# Patient Record
Sex: MALE | Race: WHITE | NOT HISPANIC OR LATINO
[De-identification: names, ages, dates, MRNs, and addresses within clinical notes are randomized per-mention and may not be internally consistent; named-entity substitution may affect disease eponyms.]

---

## 2022-04-28 PROBLEM — Z00.00 ENCOUNTER FOR PREVENTIVE HEALTH EXAMINATION: Status: ACTIVE | Noted: 2022-04-28

## 2022-08-05 ENCOUNTER — APPOINTMENT (OUTPATIENT)
Dept: ENDOCRINOLOGY | Facility: CLINIC | Age: 47
End: 2022-08-05

## 2022-08-05 VITALS
DIASTOLIC BLOOD PRESSURE: 90 MMHG | HEART RATE: 98 BPM | SYSTOLIC BLOOD PRESSURE: 150 MMHG | OXYGEN SATURATION: 99 % | BODY MASS INDEX: 35 KG/M2 | HEIGHT: 64 IN | WEIGHT: 205 LBS

## 2022-08-05 DIAGNOSIS — Z83.3 FAMILY HISTORY OF DIABETES MELLITUS: ICD-10-CM

## 2022-08-05 DIAGNOSIS — R79.89 OTHER SPECIFIED ABNORMAL FINDINGS OF BLOOD CHEMISTRY: ICD-10-CM

## 2022-08-05 DIAGNOSIS — E78.5 HYPERLIPIDEMIA, UNSPECIFIED: ICD-10-CM

## 2022-08-05 DIAGNOSIS — R40.0 SOMNOLENCE: ICD-10-CM

## 2022-08-05 DIAGNOSIS — N52.9 MALE ERECTILE DYSFUNCTION, UNSPECIFIED: ICD-10-CM

## 2022-08-05 DIAGNOSIS — Z82.49 FAMILY HISTORY OF ISCHEMIC HEART DISEASE AND OTHER DISEASES OF THE CIRCULATORY SYSTEM: ICD-10-CM

## 2022-08-05 DIAGNOSIS — E55.9 VITAMIN D DEFICIENCY, UNSPECIFIED: ICD-10-CM

## 2022-08-05 DIAGNOSIS — I10 ESSENTIAL (PRIMARY) HYPERTENSION: ICD-10-CM

## 2022-08-05 DIAGNOSIS — R73.03 PREDIABETES.: ICD-10-CM

## 2022-08-05 PROCEDURE — 99204 OFFICE O/P NEW MOD 45 MIN: CPT

## 2022-08-09 LAB
ALBUMIN SERPL ELPH-MCNC: 4.7 G/DL
ALP BLD-CCNC: 130 U/L
ALT SERPL-CCNC: 123 U/L
ANION GAP SERPL CALC-SCNC: 12 MMOL/L
AST SERPL-CCNC: 71 U/L
BASOPHILS # BLD AUTO: 0.02 K/UL
BASOPHILS NFR BLD AUTO: 0.3 %
BILIRUB SERPL-MCNC: 0.8 MG/DL
BUN SERPL-MCNC: 18 MG/DL
CALCIUM SERPL-MCNC: 9.4 MG/DL
CHLORIDE SERPL-SCNC: 105 MMOL/L
CO2 SERPL-SCNC: 24 MMOL/L
CREAT SERPL-MCNC: 0.92 MG/DL
EGFR: 103 ML/MIN/1.73M2
EOSINOPHIL # BLD AUTO: 0.15 K/UL
EOSINOPHIL NFR BLD AUTO: 2.2 %
GLUCOSE SERPL-MCNC: 94 MG/DL
HCT VFR BLD CALC: 44.9 %
HGB BLD-MCNC: 14.2 G/DL
IMM GRANULOCYTES NFR BLD AUTO: 0.4 %
LYMPHOCYTES # BLD AUTO: 2.73 K/UL
LYMPHOCYTES NFR BLD AUTO: 40.2 %
MAN DIFF?: NORMAL
MCHC RBC-ENTMCNC: 31.6 GM/DL
MCHC RBC-ENTMCNC: 32 PG
MCV RBC AUTO: 101.1 FL
MONOCYTES # BLD AUTO: 0.58 K/UL
MONOCYTES NFR BLD AUTO: 8.5 %
NEUTROPHILS # BLD AUTO: 3.28 K/UL
NEUTROPHILS NFR BLD AUTO: 48.4 %
PLATELET # BLD AUTO: 280 K/UL
POTASSIUM SERPL-SCNC: 4.4 MMOL/L
PROT SERPL-MCNC: 7.3 G/DL
RBC # BLD: 4.44 M/UL
RBC # FLD: 13.5 %
SODIUM SERPL-SCNC: 140 MMOL/L
T3FREE SERPL-MCNC: 4 PG/ML
T4 FREE SERPL-MCNC: 1.2 NG/DL
TSH SERPL-ACNC: <0.01 UIU/ML
WBC # FLD AUTO: 6.79 K/UL

## 2022-08-10 PROBLEM — I10 HYPERTENSION: Status: ACTIVE | Noted: 2022-08-05

## 2022-08-10 PROBLEM — R73.03 PRE-DIABETES: Status: ACTIVE | Noted: 2022-08-05

## 2022-08-10 PROBLEM — E78.5 DYSLIPIDEMIA: Status: ACTIVE | Noted: 2022-08-05

## 2022-08-10 PROBLEM — N52.9 ERECTILE DYSFUNCTION: Status: ACTIVE | Noted: 2022-08-05

## 2022-08-10 PROBLEM — R40.0 DAYTIME SOMNOLENCE: Status: ACTIVE | Noted: 2022-08-05

## 2022-08-10 PROBLEM — E55.9 VITAMIN D DEFICIENCY: Status: ACTIVE | Noted: 2022-08-05

## 2022-08-10 PROBLEM — Z83.3 FAMILY HISTORY OF DIABETES MELLITUS: Status: ACTIVE | Noted: 2022-08-05

## 2022-08-10 PROBLEM — Z82.49 FAMILY HISTORY OF HYPERTENSION: Status: ACTIVE | Noted: 2022-08-05

## 2022-08-12 LAB
TSH RECEPTOR AB: <1.1 IU/L
TSI ACT/NOR SER: 0.94 IU/L

## 2022-08-15 PROBLEM — R79.89 LOW TSH LEVEL: Status: ACTIVE | Noted: 2022-08-05

## 2022-08-15 NOTE — HISTORY OF PRESENT ILLNESS
[FreeTextEntry1] : 46-year-old male referred by his primary care for low TSH levels.  These were first noted on March 23, 2022.  There is a family history of thyroid dysfunction.  He also reports some eye redness and eye discomfort.  He denies any local neck symptoms like rapid enlargement of neck mass any prolonged coughing or breathing difficulties.  Denies any problem swallowing liquids or solids.  Has never been exposed to nuclear accident or has family history of thyroid cancer never to been treated with radioactive iodine taken thyroid medications.  Any biotin lithium or amiodarone.  I reviewed his lab he has had elevated free T3 levels normal treat for levels.    Clinically he appears euthyroid but reports some nervousness.  He does complain of increased thirst and getting up at night to urinate.  He endorses some fatigue and sweats

## 2022-08-22 ENCOUNTER — RESULT REVIEW (OUTPATIENT)
Age: 47
End: 2022-08-22

## 2022-09-26 ENCOUNTER — APPOINTMENT (OUTPATIENT)
Dept: ENDOCRINOLOGY | Facility: CLINIC | Age: 47
End: 2022-09-26

## 2023-09-13 ENCOUNTER — APPOINTMENT (OUTPATIENT)
Dept: ENDOCRINOLOGY | Facility: CLINIC | Age: 48
End: 2023-09-13

## 2024-03-12 ENCOUNTER — APPOINTMENT (OUTPATIENT)
Dept: ENDOCRINOLOGY | Facility: CLINIC | Age: 49
End: 2024-03-12
Payer: COMMERCIAL

## 2024-03-12 VITALS
WEIGHT: 206 LBS | HEIGHT: 64 IN | HEART RATE: 83 BPM | DIASTOLIC BLOOD PRESSURE: 81 MMHG | OXYGEN SATURATION: 97 % | BODY MASS INDEX: 35.17 KG/M2 | SYSTOLIC BLOOD PRESSURE: 138 MMHG

## 2024-03-12 DIAGNOSIS — E05.90 THYROTOXICOSIS, UNSPECIFIED W/OUT THYROTOXIC CRISIS OR STORM: ICD-10-CM

## 2024-03-12 PROCEDURE — 99214 OFFICE O/P EST MOD 30 MIN: CPT | Mod: 25

## 2024-03-12 PROCEDURE — G2211 COMPLEX E/M VISIT ADD ON: CPT

## 2024-03-12 NOTE — PHYSICAL EXAM
[Alert] : alert [No Acute Distress] : no acute distress [Well Nourished] : well nourished [Well Developed] : well developed [Normal Voice/Communication] : normal voice communication [EOMI] : extra ocular movement intact [No Neck Mass] : no neck mass was observed [Normal Hearing] : hearing was normal [No LAD] : no lymphadenopathy [Thyroid Not Enlarged] : the thyroid was not enlarged [No Respiratory Distress] : no respiratory distress [No Thyroid Nodules] : no palpable thyroid nodules [Normal Rate and Effort] : normal respiratory rate and effort [Regular Rhythm] : with a regular rhythm [Normal Rate] : heart rate was normal [Normal Gait] : normal gait [No Involuntary Movements] : no involuntary movements were seen [Normal Strength/Tone] : muscle strength and tone were normal [No Motor Deficits] : the motor exam was normal [Normal Reflexes] : deep tendon reflexes were 2+ and symmetric [No Tremors] : no tremors [Oriented x3] : oriented to person, place, and time [Recent Memory Normal] : recent memory was not impaired [Normal Affect] : the affect was normal [Normal Mood] : the mood was normal [Normal Insight/Judgement] : insight and judgment were intact [Remote Memory Normal] : remote memory was not impaired [de-identified] : Bilateral mild proptosis with injected conjunctivae

## 2024-03-12 NOTE — HISTORY OF PRESENT ILLNESS
[FreeTextEntry1] : Prior h/o thyroid disorders- Hyperthyroidism diagnosed in March 2022 with low TSH levels. Thyroid medications- Denies Most recent TSH-December 2023; 0.05 uIU/mL with a free T4 of 1.25 ng/dL.  Previously 0.104 uIU/mL with 1.09 ng/dL Most recent thyroid ultrasound- Denies Thyroid uptake and scan from August 2022 shows a right lobe larger than left with increased tracer localization throughout the right lobe that is nodular in configuration with relatively decreased tracer localization in the left lobe and isthmus. Prior biopsies- Denies Prior h/o thyroid surgery- Denies Prior h/o DOWD treatment- Denies Recent illness- Denies Biotin, lithium, or amiodarone use- Denies Prior h/o radiation exposure to head or neck- Denies Prior h/o exposure to nuclear accident- Denies Family h/o thyroid cancer- Denies Family h/o thyroid disorder- Yes  Patient is complaining of bilateral eye discomfort with redness.  He states that he has seen an eye doctor, but unsure if was an ophthalmologist as there was no treatment recommended.  Denies temperature intolerances. Denies abnormal mood. Denies palpitations, slow or fast heart rate. Denies chest pain, and shortness of breath on exertion. Denies unintentional weight change. Denies abnormal bowel habits. Hair thinning. Denies changes to nails and skin. Denies weakness, cramps, and myalgias. Denies joint pains, aches, and stiffness.  Denies any neck or thyroid nodularity on self-examination. Denies neck tenderness. Denies dysphagia, dysphonia, and dyspnea. Denies history of prolonged cough. Denies rapid enlargement of neck mass.  3/12/2024- FOLLOW UP Patient was initially seen by Dr. Fletcher in August 2022.  This is patient's first visit with me, Dr. Zambrano.  Other than his eye complaints, the patient seems to be lacking compelling symptoms of hyperthyroidism.  His most recent labs from December 2023 demonstrate subclinical hyperthyroidism with a suppressed TSH and free T4 within normal range.  I recommend repeating thyroid function test along with antibody studies and thyroid ultrasound.  Additionally I have requested confirmation that the patient visit ophthalmologist and will send patient to ophthalmologist if he has not already seen one to manage his eye disease.  If patient has thyroid eye disease related to Graves' disease then I would recommend patient pursue total thyroidectomy for best outcome.  However if patient's eye disease is unrelated to his thyroid function, then I would recommend continued observation of the patient does not have significant complications.  Will follow-up with results and plan for patient prior to next visit in 6 months. All questions and concerns were fully addressed to patient's satisfaction. Patient is in agreement with stated plan.

## 2024-03-12 NOTE — REASON FOR VISIT
[Hyperthyroidism] : hyperthyroidism [Other: ______] : provided by YANCY [Time Spent: ____ minutes] : Total time spent using  services: [unfilled] minutes. The patient's primary language is not English thus required  services. [Source: ______] : History obtained from HANNAH [Interpreters_IDNumber] : [id number] [Interpreters_FullName] : [place] [FreeTextEntry2] : Dr. Fletcher

## 2024-06-27 ENCOUNTER — LABORATORY RESULT (OUTPATIENT)
Age: 49
End: 2024-06-27

## 2024-06-28 LAB
CHOLEST SERPL-MCNC: 206 MG/DL
HDLC SERPL-MCNC: 46 MG/DL
LDLC SERPL CALC-MCNC: 131 MG/DL
NONHDLC SERPL-MCNC: 159 MG/DL
TRIGL SERPL-MCNC: 157 MG/DL

## 2024-08-01 ENCOUNTER — APPOINTMENT (OUTPATIENT)
Dept: ENDOCRINOLOGY | Facility: CLINIC | Age: 49
End: 2024-08-01
Payer: COMMERCIAL

## 2024-08-01 DIAGNOSIS — R79.89 OTHER SPECIFIED ABNORMAL FINDINGS OF BLOOD CHEMISTRY: ICD-10-CM

## 2024-08-01 DIAGNOSIS — E05.90 THYROTOXICOSIS, UNSPECIFIED W/OUT THYROTOXIC CRISIS OR STORM: ICD-10-CM

## 2024-08-01 DIAGNOSIS — E05.00 THYROTOXICOSIS WITH DIFFUSE GOITER W/OUT THYROTOXIC CRISIS OR STORM: ICD-10-CM

## 2024-08-01 PROCEDURE — G2211 COMPLEX E/M VISIT ADD ON: CPT | Mod: NC

## 2024-08-01 PROCEDURE — 99442: CPT

## 2024-08-01 NOTE — REASON FOR VISIT
[Other Location: e.g. School (Enter Location, City,State)___] : at [unfilled], at the time of the visit. [Medical Office: ([facility])___] : at the medical office located in  [Verbal consent obtained from patient] : the patient, [unfilled] [Follow - Up] : a follow-up visit [Hyperthyroidism] : hyperthyroidism [Other: ______] : provided by YANCY [Time Spent: ____ minutes] : Total time spent using  services: [unfilled] minutes. The patient's primary language is not English thus required  services. [Source: ______] : History obtained from HANNAH [Interpreters_IDNumber] : [id number] [Interpreters_FullName] : Jazzy [TWNoteComboBox1] : Cuban

## 2024-08-01 NOTE — HISTORY OF PRESENT ILLNESS
[FreeTextEntry1] : Prior h/o thyroid disorders- Graves' disease. Hyperthyroidism diagnosed in March 2022 with low TSH levels. Thyroid medications- Denies Most recent TSH- December 2023; 0.05 uIU/mL with a free T4 of 1.25 ng/dL.  Previously 0.104 uIU/mL with 1.09 ng/dL Most recent thyroid ultrasound-June 2024: 1.6 cm TR3 nodule in the right lobe. Thyroid uptake and scan from August 2022 showed a right lobe larger than left with increased tracer localization throughout the right lobe that is nodular in configuration with relatively decreased tracer localization in the left lobe and isthmus. Prior biopsies- Denies Prior h/o thyroid surgery- Denies Prior h/o DOWD treatment- Denies Recent illness- Denies Biotin, lithium, or amiodarone use- Denies Prior h/o radiation exposure to head or neck- Denies Prior h/o exposure to nuclear accident- Denies Family h/o thyroid cancer- Denies Family h/o thyroid disorder- Yes  Patient is complaining of bilateral eye discomfort with redness.  He states that he has seen an eye doctor, but unsure if was an ophthalmologist as there was no treatment recommended.  Denies temperature intolerances. Denies abnormal mood. Denies palpitations, slow or fast heart rate. Denies chest pain, and shortness of breath on exertion. Denies unintentional weight change. Denies abnormal bowel habits. Hair thinning. Denies changes to nails and skin. Denies weakness, cramps, and myalgias. Denies joint pains, aches, and stiffness.  Denies any neck or thyroid nodularity on self-examination. Denies neck tenderness. Denies dysphagia, dysphonia, and dyspnea. Denies history of prolonged cough. Denies rapid enlargement of neck mass.  3/12/2024- FOLLOW UP Patient was initially seen by Dr. Fletcher in August 2022.  This is patient's first visit with me, Dr. Zambrano.  Other than his eye complaints, the patient seems to be lacking compelling symptoms of hyperthyroidism.  His most recent labs from December 2023 demonstrate subclinical hyperthyroidism with a suppressed TSH and free T4 within normal range.  I recommend repeating thyroid function test along with antibody studies and thyroid ultrasound.  Additionally I have requested confirmation that the patient visit ophthalmologist and will send patient to ophthalmologist if he has not already seen one to manage his eye disease.  If patient has thyroid eye disease related to Graves' disease then I would recommend patient pursue total thyroidectomy for best outcome.  However if patient's eye disease is unrelated to his thyroid function, then I would recommend continued observation of the patient does not have significant complications.  Will follow-up with results and plan for patient prior to next visit in 6 months. All questions and concerns were fully addressed to patient's satisfaction. Patient is in agreement with stated plan.  08/01/2024- TELEPHONIC FOLLOW UP Patient doing well overall.  I discussed his most recent thyroid studies and thyroid ultrasound.  Once again subclinical hyperthyroidism is demonstrated.  Thyroid-stimulating antibodies were detected in his serum, indicating presence of Graves' disease.  Thyroid ultrasound demonstrates a 1.6 cm nodule in the right lobe of the thyroid gland.  Radioactive iodine uptake scan from 2022 demonstrates increased activity within the right lobe. I recommended the patient to start taking methimazole daily.  Patient explains that he was prescribed medication for his thyroid in [facility], at least 3 months ago.  Patient did not divulge this information to me during our initial encounter on March 12, 2024.  Patient is unsure if he is taking methimazole, and also unsure of what dose medication he is taking.  We will reach out to the patient who is at home to confirm his medication and dose.  If patient is currently taking methimazole and then he will need to have the dose increased.  Thyroid studies will be checked at least 6 weeks after the dose adjustment. All questions and concerns were fully addressed to patient's satisfaction. Patient is in agreement with stated plan.

## 2024-08-01 NOTE — PHYSICAL EXAM
[Alert] : alert [No Acute Distress] : no acute distress [Normal Voice/Communication] : normal voice communication [Normal Hearing] : hearing was normal [No Respiratory Distress] : no respiratory distress [Oriented x3] : oriented to person, place, and time [Normal Affect] : the affect was normal [Recent Memory Normal] : recent memory was not impaired [Normal Insight/Judgement] : insight and judgment were intact [Normal Mood] : the mood was normal [Remote Memory Normal] : remote memory was not impaired

## 2024-09-09 ENCOUNTER — APPOINTMENT (OUTPATIENT)
Dept: ENDOCRINOLOGY | Facility: CLINIC | Age: 49
End: 2024-09-09
Payer: COMMERCIAL

## 2024-09-09 VITALS
BODY MASS INDEX: 35.17 KG/M2 | HEART RATE: 90 BPM | DIASTOLIC BLOOD PRESSURE: 82 MMHG | SYSTOLIC BLOOD PRESSURE: 140 MMHG | HEIGHT: 64 IN | WEIGHT: 206 LBS | OXYGEN SATURATION: 97 %

## 2024-09-09 DIAGNOSIS — E05.90 THYROTOXICOSIS, UNSPECIFIED W/OUT THYROTOXIC CRISIS OR STORM: ICD-10-CM

## 2024-09-09 PROCEDURE — G2211 COMPLEX E/M VISIT ADD ON: CPT

## 2024-09-09 PROCEDURE — 99213 OFFICE O/P EST LOW 20 MIN: CPT

## 2024-09-09 RX ORDER — PROPRANOLOL HYDROCHLORIDE 40 MG/1
40 TABLET ORAL
Refills: 0 | Status: ACTIVE | COMMUNITY

## 2024-09-09 NOTE — HISTORY OF PRESENT ILLNESS
[FreeTextEntry1] : Prior h/o thyroid disorders- Graves' disease. Hyperthyroidism diagnosed in March 2022 with low TSH levels. Thyroid medications- Methimazole 5 mg twice daily Most recent TSH-0.03 uIU/mL with a free T4 of 1.1 ng/dL in July 2024 Most recent thyroid ultrasound-June 2024: 1.6 cm TR3 nodule in the right lobe. Thyroid uptake and scan from August 2022 showed a right lobe larger than left with increased tracer localization throughout the right lobe that is nodular in configuration with relatively decreased tracer localization in the left lobe and isthmus. Prior biopsies- Denies Prior h/o thyroid surgery- Denies Prior h/o DOWD treatment- Denies Recent illness- Denies Biotin, lithium, or amiodarone use- Denies Prior h/o radiation exposure to head or neck- Denies Prior h/o exposure to nuclear accident- Denies Family h/o thyroid cancer- Denies Family h/o thyroid disorder- Yes  Denies temperature intolerances. Denies abnormal mood. Denies palpitations, slow or fast heart rate. Denies chest pain, and shortness of breath on exertion. Denies unintentional weight change. Denies abnormal bowel habits. Hair thinning. Denies changes to nails and skin. Denies weakness, cramps, and myalgias. Denies joint pains, aches, and stiffness.  Denies any neck or thyroid nodularity on self-examination. Denies neck tenderness. Denies dysphagia, dysphonia, and dyspnea. Denies history of prolonged cough. Denies rapid enlargement of neck mass.  3/12/2024- FOLLOW UP Patient was initially seen by Dr. Fletcher in August 2022.  This is patient's first visit with me, Dr. Zambrano.  Other than his eye complaints, the patient seems to be lacking compelling symptoms of hyperthyroidism.  His most recent labs from December 2023 demonstrate subclinical hyperthyroidism with a suppressed TSH and free T4 within normal range.  I recommend repeating thyroid function test along with antibody studies and thyroid ultrasound.  Additionally I have requested confirmation that the patient visit ophthalmologist and will send patient to ophthalmologist if he has not already seen one to manage his eye disease.  If patient has thyroid eye disease related to Graves' disease then I would recommend patient pursue total thyroidectomy for best outcome.  However if patient's eye disease is unrelated to his thyroid function, then I would recommend continued observation of the patient does not have significant complications.  Will follow-up with results and plan for patient prior to next visit in 6 months. All questions and concerns were fully addressed to patient's satisfaction. Patient is in agreement with stated plan.  08/01/2024- TELEPHONIC FOLLOW UP Patient doing well overall.  I discussed his most recent thyroid studies and thyroid ultrasound.  Once again subclinical hyperthyroidism is demonstrated.  Thyroid-stimulating antibodies were detected in his serum, indicating presence of Graves' disease.  Thyroid ultrasound demonstrates a 1.6 cm nodule in the right lobe of the thyroid gland.  Radioactive iodine uptake scan from 2022 demonstrates increased activity within the right lobe. I recommended the patient to start taking methimazole daily.  Patient explains that he was prescribed medication for his thyroid in [facility], at least 3 months ago.  Patient did not divulge this information to me during our initial encounter on March 12, 2024.  Patient is unsure if he is taking methimazole, and also unsure of what dose medication he is taking.  We will reach out to the patient who is at home to confirm his medication and dose.  If patient is currently taking methimazole and then he will need to have the dose increased.  Thyroid studies will be checked at least 6 weeks after the dose adjustment. All questions and concerns were fully addressed to patient's satisfaction. Patient is in agreement with stated plan.  09/09/2024- FOLLOW UP Patient is doing well overall without any significant complaints of thyroid hormone dysregulation.  He has been taking methimazole 5 mg twice daily for close to 2 months consistently.  Routine thyroid studies are needed to assess methimazole dose adequacy. The results of the testing will be communicated to the patient over the phone once they are available, along with any additional recommendations if necessary.  Otherwise, patient will continue this dose and follow-up with me in 6 months, having completed routine thyroid studies prior to that visit. All questions and concerns were fully addressed to patient's satisfaction. Patient is in agreement with stated plan.

## 2024-09-09 NOTE — REASON FOR VISIT
[Follow - Up] : a follow-up visit [Hyperthyroidism] : hyperthyroidism [Other: ______] : provided by YANCY [Time Spent: ____ minutes] : Total time spent using  services: [unfilled] minutes. The patient's primary language is not English thus required  services. [Source: ______] : History obtained from HANNAH [Interpreters_IDNumber] : [id number] [Interpreters_FullName] : Jorje [TWNoteComboBox1] : Panamanian

## 2024-09-09 NOTE — HISTORY OF PRESENT ILLNESS
[FreeTextEntry1] : Prior h/o thyroid disorders- Graves' disease. Hyperthyroidism diagnosed in March 2022 with low TSH levels. Thyroid medications- Methimazole 5 mg twice daily Most recent TSH-0.03 uIU/mL with a free T4 of 1.1 ng/dL in July 2024 Most recent thyroid ultrasound-June 2024: 1.6 cm TR3 nodule in the right lobe. Thyroid uptake and scan from August 2022 showed a right lobe larger than left with increased tracer localization throughout the right lobe that is nodular in configuration with relatively decreased tracer localization in the left lobe and isthmus. Prior biopsies- Denies Prior h/o thyroid surgery- Denies Prior h/o DOWD treatment- Denies Recent illness- Denies Biotin, lithium, or amiodarone use- Denies Prior h/o radiation exposure to head or neck- Denies Prior h/o exposure to nuclear accident- Denies Family h/o thyroid cancer- Denies Family h/o thyroid disorder- Yes  Denies temperature intolerances. Denies abnormal mood. Denies palpitations, slow or fast heart rate. Denies chest pain, and shortness of breath on exertion. Denies unintentional weight change. Denies abnormal bowel habits. Hair thinning. Denies changes to nails and skin. Denies weakness, cramps, and myalgias. Denies joint pains, aches, and stiffness.  Denies any neck or thyroid nodularity on self-examination. Denies neck tenderness. Denies dysphagia, dysphonia, and dyspnea. Denies history of prolonged cough. Denies rapid enlargement of neck mass.  3/12/2024- FOLLOW UP Patient was initially seen by Dr. Fletcher in August 2022.  This is patient's first visit with me, Dr. Zambrano.  Other than his eye complaints, the patient seems to be lacking compelling symptoms of hyperthyroidism.  His most recent labs from December 2023 demonstrate subclinical hyperthyroidism with a suppressed TSH and free T4 within normal range.  I recommend repeating thyroid function test along with antibody studies and thyroid ultrasound.  Additionally I have requested confirmation that the patient visit ophthalmologist and will send patient to ophthalmologist if he has not already seen one to manage his eye disease.  If patient has thyroid eye disease related to Graves' disease then I would recommend patient pursue total thyroidectomy for best outcome.  However if patient's eye disease is unrelated to his thyroid function, then I would recommend continued observation of the patient does not have significant complications.  Will follow-up with results and plan for patient prior to next visit in 6 months. All questions and concerns were fully addressed to patient's satisfaction. Patient is in agreement with stated plan.  08/01/2024- TELEPHONIC FOLLOW UP Patient doing well overall.  I discussed his most recent thyroid studies and thyroid ultrasound.  Once again subclinical hyperthyroidism is demonstrated.  Thyroid-stimulating antibodies were detected in his serum, indicating presence of Graves' disease.  Thyroid ultrasound demonstrates a 1.6 cm nodule in the right lobe of the thyroid gland.  Radioactive iodine uptake scan from 2022 demonstrates increased activity within the right lobe. I recommended the patient to start taking methimazole daily.  Patient explains that he was prescribed medication for his thyroid in [facility], at least 3 months ago.  Patient did not divulge this information to me during our initial encounter on March 12, 2024.  Patient is unsure if he is taking methimazole, and also unsure of what dose medication he is taking.  We will reach out to the patient who is at home to confirm his medication and dose.  If patient is currently taking methimazole and then he will need to have the dose increased.  Thyroid studies will be checked at least 6 weeks after the dose adjustment. All questions and concerns were fully addressed to patient's satisfaction. Patient is in agreement with stated plan.  09/09/2024- FOLLOW UP Patient is doing well overall without any significant complaints of thyroid hormone dysregulation.  He has been taking methimazole 5 mg twice daily for close to 2 months consistently.  Routine thyroid studies are needed to assess methimazole dose adequacy. The results of the testing will be communicated to the patient over the phone once they are available, along with any additional recommendations if necessary.  Otherwise, patient will continue this dose and follow-up with me in 6 months, having completed routine thyroid studies prior to that visit. All questions and concerns were fully addressed to patient's satisfaction. Patient is in agreement with stated plan.

## 2024-09-09 NOTE — ASSESSMENT
[FreeTextEntry1] : Note: This document has been prepared using voice dictation software. While efforts have been made to ensure accuracy, some typographical or transcription errors may be present. Please interpret the information accordingly.

## 2024-09-09 NOTE — PHYSICAL EXAM
[Alert] : alert [No Acute Distress] : no acute distress [Normal Voice/Communication] : normal voice communication [Normal Hearing] : hearing was normal [No Respiratory Distress] : no respiratory distress [Oriented x3] : oriented to person, place, and time [Normal Affect] : the affect was normal [Recent Memory Normal] : recent memory was not impaired [Normal Insight/Judgement] : insight and judgment were intact [Normal Mood] : the mood was normal [Remote Memory Normal] : remote memory was not impaired [No Neck Mass] : no neck mass was observed [No LAD] : no lymphadenopathy [Thyroid Not Enlarged] : the thyroid was not enlarged

## 2024-09-09 NOTE — REASON FOR VISIT
[Follow - Up] : a follow-up visit [Hyperthyroidism] : hyperthyroidism [Other: ______] : provided by YANCY [Time Spent: ____ minutes] : Total time spent using  services: [unfilled] minutes. The patient's primary language is not English thus required  services. [Source: ______] : History obtained from HANNAH [Interpreters_FullName] : Jorje [Interpreters_IDNumber] : [id number] [TWNoteComboBox1] : Bulgarian

## 2024-09-10 LAB
ALBUMIN SERPL ELPH-MCNC: 4.9 G/DL
ALP BLD-CCNC: 141 U/L
ALT SERPL-CCNC: 41 U/L
ANION GAP SERPL CALC-SCNC: 13 MMOL/L
AST SERPL-CCNC: 37 U/L
BILIRUB SERPL-MCNC: 0.6 MG/DL
BUN SERPL-MCNC: 15 MG/DL
CALCIUM SERPL-MCNC: 9.5 MG/DL
CHLORIDE SERPL-SCNC: 101 MMOL/L
CO2 SERPL-SCNC: 24 MMOL/L
CREAT SERPL-MCNC: 1 MG/DL
EGFR: 92 ML/MIN/1.73M2
GLUCOSE SERPL-MCNC: 115 MG/DL
POTASSIUM SERPL-SCNC: 3.7 MMOL/L
PROT SERPL-MCNC: 7.7 G/DL
PSA FREE FLD-MCNC: 24 %
PSA FREE SERPL-MCNC: 0.12 NG/ML
PSA SERPL-MCNC: 0.51 NG/ML
SODIUM SERPL-SCNC: 138 MMOL/L
T3 SERPL-MCNC: 115 NG/DL
T4 FREE SERPL-MCNC: 1.1 NG/DL
TSH SERPL-ACNC: 0.06 UIU/ML

## 2024-09-11 DIAGNOSIS — E05.00 THYROTOXICOSIS WITH DIFFUSE GOITER W/OUT THYROTOXIC CRISIS OR STORM: ICD-10-CM

## 2025-03-11 ENCOUNTER — APPOINTMENT (OUTPATIENT)
Dept: ENDOCRINOLOGY | Facility: CLINIC | Age: 50
End: 2025-03-11
Payer: COMMERCIAL

## 2025-03-11 VITALS
HEART RATE: 81 BPM | DIASTOLIC BLOOD PRESSURE: 80 MMHG | HEIGHT: 64 IN | SYSTOLIC BLOOD PRESSURE: 130 MMHG | OXYGEN SATURATION: 97 % | WEIGHT: 209 LBS | BODY MASS INDEX: 35.68 KG/M2

## 2025-03-11 DIAGNOSIS — E05.00 THYROTOXICOSIS WITH DIFFUSE GOITER W/OUT THYROTOXIC CRISIS OR STORM: ICD-10-CM

## 2025-03-11 DIAGNOSIS — E04.1 NONTOXIC SINGLE THYROID NODULE: ICD-10-CM

## 2025-03-11 PROCEDURE — 99214 OFFICE O/P EST MOD 30 MIN: CPT

## 2025-03-11 PROCEDURE — G2211 COMPLEX E/M VISIT ADD ON: CPT

## 2025-03-11 RX ORDER — PROPRANOLOL HYDROCHLORIDE 40 MG/1
40 TABLET ORAL
Refills: 0 | Status: ACTIVE | COMMUNITY

## 2025-09-19 ENCOUNTER — RESULT REVIEW (OUTPATIENT)
Age: 50
End: 2025-09-19